# Patient Record
Sex: FEMALE | Race: WHITE | NOT HISPANIC OR LATINO | Employment: UNEMPLOYED | URBAN - METROPOLITAN AREA
[De-identification: names, ages, dates, MRNs, and addresses within clinical notes are randomized per-mention and may not be internally consistent; named-entity substitution may affect disease eponyms.]

---

## 2021-09-15 PROBLEM — Z91.89 AT HIGH RISK FOR BREAST CANCER: Status: ACTIVE | Noted: 2021-09-15

## 2021-09-15 PROBLEM — D24.2 FIBROADENOMA OF BREAST, LEFT: Status: ACTIVE | Noted: 2021-09-15

## 2023-05-09 ENCOUNTER — TELEPHONE (OUTPATIENT)
Dept: NEUROLOGY | Facility: CLINIC | Age: 23
End: 2023-05-09

## 2023-05-09 NOTE — TELEPHONE ENCOUNTER
Attempted to contact patient to set appointment with Kerrie Joiner NP, no answer and voice mail not set up.

## 2023-05-10 ENCOUNTER — TELEPHONE (OUTPATIENT)
Dept: NEUROLOGY | Facility: CLINIC | Age: 23
End: 2023-05-10

## 2023-05-10 NOTE — TELEPHONE ENCOUNTER
Attempted to contact to schedule new patient appointment with Kerrie Joiner NP, no answer and voice mail not set up.

## 2023-05-16 ENCOUNTER — TELEPHONE (OUTPATIENT)
Dept: NEUROLOGY | Facility: CLINIC | Age: 23
End: 2023-05-16

## 2023-06-12 ENCOUNTER — TELEPHONE (OUTPATIENT)
Dept: NEUROLOGY | Facility: CLINIC | Age: 23
End: 2023-06-12

## 2023-06-12 NOTE — TELEPHONE ENCOUNTER
----- Message from Jose David Diaz sent at 6/12/2023  2:02 PM CDT -----  Regarding: appt  Contact: THALIA RAE [[id number]]  Type:  Sooner Appointment Request    Caller is requesting a sooner appointment.  Caller declined first available appointment listed below.  Caller will not accept being placed on the waitlist and is requesting a message be sent to doctor.    Name of Caller:  Thalia    When is the first available appointment?  None through current [facility]    Symptoms:  Headaches    Best Call Back Number:  [phone]    Additional Information:  Please call to advise.

## 2023-06-12 NOTE — TELEPHONE ENCOUNTER
Called patient and she wants to see DR Guzman, informed that DR Guzman's schedule is full through October and her November scheduled will open in July. Patient will call back to schedule. Referral and notes sent to scanning to place in media.

## 2023-06-12 NOTE — TELEPHONE ENCOUNTER
----- Message from Yudi Moura sent at 6/12/2023  4:17 PM CDT -----  Contact: pt  Type:  Patient Returning Call    Who Called:  pt  Who Left Message for Patient:  yanelis  Does the patient know what this is regarding?:  n/a  Best Call Back Number:  [phone]    Additional Information:  please give pt a call back